# Patient Record
Sex: MALE | URBAN - METROPOLITAN AREA
[De-identification: names, ages, dates, MRNs, and addresses within clinical notes are randomized per-mention and may not be internally consistent; named-entity substitution may affect disease eponyms.]

---

## 2020-08-07 ENCOUNTER — OFFICE VISIT (OUTPATIENT)
Dept: URBAN - METROPOLITAN AREA SURGERY CENTER 15 | Facility: SURGERY CENTER | Age: 70
End: 2020-08-07
Payer: MEDICARE

## 2020-08-07 ENCOUNTER — CLAIMS CREATED FROM THE CLAIM WINDOW (OUTPATIENT)
Dept: URBAN - METROPOLITAN AREA CLINIC 4 | Facility: CLINIC | Age: 70
End: 2020-08-07
Payer: MEDICARE

## 2020-08-07 DIAGNOSIS — Z86.010 H/O ADENOMATOUS POLYP OF COLON: ICD-10-CM

## 2020-08-07 DIAGNOSIS — D12.5 BENIGN NEOPLASM OF SIGMOID COLON: ICD-10-CM

## 2020-08-07 DIAGNOSIS — K63.5 BENIGN COLON POLYP: ICD-10-CM

## 2020-08-07 PROCEDURE — 45385 COLONOSCOPY W/LESION REMOVAL: CPT | Performed by: INTERNAL MEDICINE

## 2020-08-07 PROCEDURE — G9936 PMH PLYP/NEO CO/RECT/JUN/ANS: HCPCS | Performed by: INTERNAL MEDICINE

## 2020-08-07 PROCEDURE — 88305 TISSUE EXAM BY PATHOLOGIST: CPT | Performed by: PATHOLOGY

## 2020-08-07 PROCEDURE — G8907 PT DOC NO EVENTS ON DISCHARG: HCPCS | Performed by: INTERNAL MEDICINE

## 2020-08-19 ENCOUNTER — DASHBOARD ENCOUNTERS (OUTPATIENT)
Age: 70
End: 2020-08-19

## 2024-11-19 ENCOUNTER — OFFICE VISIT (OUTPATIENT)
Dept: URBAN - METROPOLITAN AREA CLINIC 23 | Facility: CLINIC | Age: 74
End: 2024-11-19
Payer: MEDICARE

## 2024-11-19 VITALS
BODY MASS INDEX: 27.5 KG/M2 | WEIGHT: 203 LBS | DIASTOLIC BLOOD PRESSURE: 70 MMHG | TEMPERATURE: 98.1 F | HEIGHT: 72 IN | SYSTOLIC BLOOD PRESSURE: 132 MMHG | HEART RATE: 72 BPM

## 2024-11-19 DIAGNOSIS — M06.9 RHEUMATOID ARTHRITIS, INVOLVING UNSPECIFIED SITE, UNSPECIFIED WHETHER RHEUMATOID FACTOR PRESENT: ICD-10-CM

## 2024-11-19 DIAGNOSIS — K52.9 CHRONIC DIARRHEA: ICD-10-CM

## 2024-11-19 PROBLEM — 69896004: Status: ACTIVE | Noted: 2024-11-19

## 2024-11-19 PROCEDURE — 99204 OFFICE O/P NEW MOD 45 MIN: CPT | Performed by: STUDENT IN AN ORGANIZED HEALTH CARE EDUCATION/TRAINING PROGRAM

## 2024-11-19 NOTE — HPI-TODAY'S VISIT:
- Presents with diarrhea ongoing for approximately 1.5 months, initially intermittent and predominantly in mornings, becoming more severe during recent travel - Diarrhea frequency varies from 1-2 up to 4-5 bowel movements daily - No clear food triggers identified; symptoms occur regardless of food intake - Associated weight loss over past 1-1.5 months - Recent medication changes include: - Started hydroxychloroquine and leflunomide for rheumatoid arthritis - Previously prescribed ferrous sulfate by nephrologist, discontinued due to GI effects - Currently on olmesartan for hypertension - Tried loperamide as prescribed by PCP Dr. Burger with reported improvement when taken Past Medical History: - Rheumatoid arthritis under rheumatologist care - Hypertension - Iron deficiency Medications: - Olmesartan - Previously on: - Hydroxychloroquine (discontinued) - Leflunomide (discontinued) - Ferrous sulfate (discontinued due to GI effects) - Loperamide as needed for diarrhea

## 2024-11-20 ENCOUNTER — LAB OUTSIDE AN ENCOUNTER (OUTPATIENT)
Dept: URBAN - METROPOLITAN AREA CLINIC 23 | Facility: CLINIC | Age: 74
End: 2024-11-20

## 2024-11-21 LAB
IMMUNOGLOBULIN A, QN, SERUM: 298
INTERPRETATION: (no result)
T-TRANSGLUTAMINASE (TTG) IGA: <1

## 2024-11-22 LAB
ADENOVIRUS F 40/41: NOT DETECTED
C. DIFFICILE TOXIN A/B, STOOL - QDX: NEGATIVE
CALPROTECTIN, STOOL - QDX: (no result)
CAMPYLOBACTER: NOT DETECTED
CLOSTRIDIUM DIFFICILE: NOT DETECTED
ENTAMOEBA HISTOLYTICA: NOT DETECTED
ENTEROAGGREGATIVE E.COLI: NOT DETECTED
ENTEROTOXIGENIC E.COLI: NOT DETECTED
ESCHERICHIA COLI O157: NOT DETECTED
GIARDIA LAMBLIA: NOT DETECTED
NOROVIRUS GI/GII: NOT DETECTED
PANCREATICELASTASE ELISA, STOOL: (no result)
ROTAVIRUS A: NOT DETECTED
SALMONELLA SPP.: NOT DETECTED
SHIGA-LIKE TOXIN PRODUCING E.COLI: NOT DETECTED
SHIGELLA SPP. / ENTEROINVASIVE E.COLI: NOT DETECTED
VIBRIO PARAHAEMOLYTICUS: NOT DETECTED
VIBRIO SPP.: NOT DETECTED
YERSINIA ENTEROCOLITICA: NOT DETECTED

## 2024-12-17 ENCOUNTER — OFFICE VISIT (OUTPATIENT)
Dept: URBAN - METROPOLITAN AREA CLINIC 23 | Facility: CLINIC | Age: 74
End: 2024-12-17
Payer: MEDICARE

## 2024-12-17 VITALS
DIASTOLIC BLOOD PRESSURE: 75 MMHG | SYSTOLIC BLOOD PRESSURE: 153 MMHG | HEART RATE: 71 BPM | HEIGHT: 72 IN | WEIGHT: 206 LBS | TEMPERATURE: 97 F | BODY MASS INDEX: 27.9 KG/M2

## 2024-12-17 DIAGNOSIS — T46.5X5A: ICD-10-CM

## 2024-12-17 DIAGNOSIS — K52.9 CHRONIC DIARRHEA: ICD-10-CM

## 2024-12-17 DIAGNOSIS — Z86.0100 HISTORY OF COLON POLYPS: ICD-10-CM

## 2024-12-17 PROBLEM — 236071009: Status: ACTIVE | Noted: 2024-12-17

## 2024-12-17 PROBLEM — 428283002: Status: ACTIVE | Noted: 2024-12-17

## 2024-12-17 PROBLEM — 720674009: Status: ACTIVE | Noted: 2024-12-17

## 2024-12-17 PROCEDURE — 99213 OFFICE O/P EST LOW 20 MIN: CPT | Performed by: STUDENT IN AN ORGANIZED HEALTH CARE EDUCATION/TRAINING PROGRAM

## 2024-12-17 NOTE — HPI-MIGRATED HPI
11/19/2024  - Presents with diarrhea ongoing for approximately 1.5 months, initially intermittent and predominantly in mornings, becoming more severe during recent travel - Diarrhea frequency varies from 1-2 up to 4-5 bowel movements daily - No clear food triggers identified; symptoms occur regardless of food intake - Associated weight loss over past 1-1.5 months - Recent medication changes include: - Started hydroxychloroquine and leflunomide for rheumatoid arthritis - Previously prescribed ferrous sulfate by nephrologist, discontinued due to GI effects - Currently on olmesartan for hypertension - Tried loperamide as prescribed by PCP Dr. Burger with reported improvement when taken Past Medical History: - Rheumatoid arthritis under rheumatologist care - Hypertension - Iron deficiency Medications: - Olmesartan - Previously on: - Hydroxychloroquine (discontinued) - Leflunomide (discontinued) - Ferrous sulfate (discontinued due to GI effects) - Loperamide as needed for diarrhea

## 2024-12-17 NOTE — EXAM-PREVIOUS WORKUP
2024/11/19 laboratory evaluation: Negative celiac serologies, negative stool infectious testing, normal pancreatic elastase, normal stool calprotectin